# Patient Record
Sex: MALE | Race: WHITE | Employment: FULL TIME | URBAN - METROPOLITAN AREA
[De-identification: names, ages, dates, MRNs, and addresses within clinical notes are randomized per-mention and may not be internally consistent; named-entity substitution may affect disease eponyms.]

---

## 2017-05-26 ENCOUNTER — OFFICE VISIT (OUTPATIENT)
Dept: UROLOGY | Age: 57
End: 2017-05-26

## 2017-05-26 VITALS
WEIGHT: 209 LBS | SYSTOLIC BLOOD PRESSURE: 118 MMHG | DIASTOLIC BLOOD PRESSURE: 74 MMHG | OXYGEN SATURATION: 95 % | BODY MASS INDEX: 32.8 KG/M2 | HEIGHT: 67 IN | HEART RATE: 81 BPM

## 2017-05-26 DIAGNOSIS — Z80.42 FAMILY HISTORY OF PROSTATE CANCER: ICD-10-CM

## 2017-05-26 DIAGNOSIS — R97.20 ELEVATED PSA: Primary | ICD-10-CM

## 2017-05-26 LAB
BILIRUB UR QL STRIP: NEGATIVE
GLUCOSE UR-MCNC: NEGATIVE MG/DL
KETONES P FAST UR STRIP-MCNC: NEGATIVE MG/DL
PH UR STRIP: 7 [PH] (ref 4.6–8)
PROT UR QL STRIP: NEGATIVE MG/DL
SP GR UR STRIP: 1.02 (ref 1–1.03)
UA UROBILINOGEN AMB POC: NORMAL (ref 0.2–1)
URINALYSIS CLARITY POC: CLEAR
URINALYSIS COLOR POC: YELLOW
URINE BLOOD POC: NORMAL
URINE LEUKOCYTES POC: NEGATIVE
URINE NITRITES POC: NEGATIVE

## 2017-05-26 NOTE — PATIENT INSTRUCTIONS
Prostate Cancer Screening: Care Instructions  Your Care Instructions    The prostate gland is an organ found just below a man's bladder. It is the size and shape of a walnut. It surrounds the tube that carries urine from the bladder out of the body through the penis. This tube is called the urethra. Prostate cancer is the abnormal growth of cells in the prostate. It is the second most common type of cancer in men. (Skin cancer is the most common.)  Most cases of prostate cancer occur in men older than 72. The disease runs in families. And it's more common in -American men. When it's found and treated early, prostate cancer may be cured. But it is not always treated. This is because prostate cancer may not shorten your life, especially if you are older and the cancer is growing slowly. Follow-up care is a key part of your treatment and safety. Be sure to make and go to all appointments, and call your doctor if you are having problems. It's also a good idea to know your test results and keep a list of the medicines you take. What are the screening tests for prostate cancer? The main screening test for prostate cancer is the prostate-specific antigen (PSA) test. This is a blood test that measures how much PSA is in your blood. A high level may mean that you have an enlargement, an infection, or cancer. Along with the PSA test, you may have a digital rectal exam. The digital (finger) rectal exam checks for anything abnormal in your prostate. To do the exam, the doctor puts a lubricated, gloved finger into your rectum. If these tests suggest cancer, you may need a prostate biopsy. How is prostate cancer diagnosed? In a biopsy, the doctor takes small tissue samples from your prostate gland. Another doctor then looks at the tissue under a microscope to see if there are cancer cells, signs of infection, or other problems. The results help diagnose prostate cancer.   What are the pros and cons of screening? Neither a PSA test nor a digital rectal exam can tell you for sure that you do or do not have cancer. But they can help you decide if you need more tests, such as a prostate biopsy. Screening tests may be useful because most men with prostate cancer don't have symptoms. It can be hard to know if you have cancer until it is more advanced. And then it's harder to treat. But having a PSA test can also cause harm. The test may show high levels of PSA that aren't caused by cancer. So you could have a prostate biopsy you didn't need. Or the PSA test might be normal when there is cancer, so a cancer might not be found early. The test can also find cancers that would never have caused a problem during your lifetime. So you might have treatment that was not needed. Prostate cancer usually develops late in life and grows slowly. For many men, it does not shorten their lives. Some experts advise screening only for men who are at high risk. Talk with your doctor to see if screening is right for you. Where can you learn more? Go to http://reina-marge.info/. Enter R550 in the search box to learn more about \"Prostate Cancer Screening: Care Instructions. \"  Current as of: July 26, 2016  Content Version: 11.2  © 9367-7464 Gust, [employer]. Care instructions adapted under license by [employer] (which disclaims liability or warranty for this information). If you have questions about a medical condition or this instruction, always ask your healthcare professional. [place] [address] any warranty or liability for your use of this information.

## 2017-05-26 NOTE — PROGRESS NOTES
Chief Complaint   Patient presents with    New Patient    Elevated PSA     with family history or prostate cancer       HISTORY OF PRESENT ILLNESS:  Donna Patel is a 56 y.o. male who presents today with a progressive rise in his PSA over the past couple of years. His PSA previously had been around 2 but over this past year it increased over 3 and he is referred here because of a significant family history of prostate cancer in his father and a number of uncles. He has had no change in urination or any evidence of back pain weight loss peripheral edema or change in nocturia. ROS past history and review of systems are all documented on the chart, refer to that for details    Past Medical History:   Diagnosis Date    Hypercholesterolemia     Plantar fasciitis     Right knee pain        Past Surgical History:   Procedure Laterality Date    APPENDECTOMY      HX APPENDECTOMY  ~1976       Social History   Substance Use Topics    Smoking status: Never Smoker    Smokeless tobacco: Never Used      Comment: 1/1981    Alcohol use No      Comment: rarely       No Known Allergies    Family History   Problem Relation Age of Onset    Depression Mother     Asthma Mother     Diabetes Mother     Hypertension Mother     Depression Father     Cancer Father      prostate    Hypertension Father     High Cholesterol Father     Dementia Father     Prostate Cancer Paternal Uncle     Prostate Cancer Paternal Uncle     Prostate Cancer Paternal Uncle     Prostate Cancer Paternal Uncle        Current Outpatient Prescriptions   Medication Sig Dispense Refill    aspirin 81 mg chewable tablet Take 81 mg by mouth daily.  GLUCOSAMINE/D3/BOSWELLIA CLEVE (OSTEO BI-FLEX, 5-LOXIN, PO) Take  by mouth.  HYDROcodone-acetaminophen (NORCO) 5-325 mg per tablet Take 1 Tab by mouth every four (4) hours as needed for Pain. Max Daily Amount: 6 Tabs.  20 Tab 0    glucosamine-chondroitin 250-200 mg tab Take  by mouth two (2) times a day. PHYSICAL EXAMINATION:   Visit Vitals    /74 (BP 1 Location: Left arm, BP Patient Position: Sitting)    Pulse 81    Ht 5' 7\" (1.702 m)    Wt 209 lb (94.8 kg)    SpO2 95%    BMI 32.73 kg/m2     Constitutional: WDWN, Pleasant and appropriate affect, No acute distress. CV:  No peripheral swelling noted  Respiratory: No respiratory distress or difficulties  Abdomen:  No abdominal masses or tenderness. No CVA tenderness. No inguinal hernias noted.  Male:    WES:Perineum normal to visual inspection, no erythema or irritation, normal anal sphincter tone, the prostate is about 50 g in size firm but not really nodular and generally symmetrical.  Under normal conditions I would think this is probably benign in nature. SCROTUM:  No scrotal rash or lesions noticed. Normal bilateral testes and epididymis. PENIS: Urethral meatus normal in location and size. No urethral discharge. Skin: No jaundice. Neuro/Psych:  Alert and oriented x 3, affect appropriate. Lymphatic:   No enlarged inguinal lymph nodes. Results for orders placed or performed in visit on 05/26/17   AMB POC URINALYSIS DIP STICK AUTO W/O MICRO   Result Value Ref Range    Color (UA POC) Yellow     Clarity (UA POC) Clear     Glucose (UA POC) Negative Negative    Bilirubin (UA POC) Negative Negative    Ketones (UA POC) Negative Negative    Specific gravity (UA POC) 1.020 1.001 - 1.035    Blood (UA POC) Trace Negative    pH (UA POC) 7.0 4.6 - 8.0    Protein (UA POC) Negative Negative mg/dL    Urobilinogen (UA POC) 0.2 mg/dL 0.2 - 1    Nitrites (UA POC) Negative Negative    Leukocyte esterase (UA POC) Negative Negative         REVIEW OF LABS AND IMAGING:     Imaging Report Reviewed? NO     Images Reviewed? NO           Other Lab Data Reviewed? YES         ASSESSMENT:     ICD-10-CM ICD-9-CM    1.  Elevated PSA R97.20 790.93 [place] GÉNESIS,POST-VOID RES,US,NON-IMAGING      AMB POC URINALYSIS DIP STICK AUTO W/O MICRO   2. Family history of prostate cancer Z80.42 V16.42 [place] GÉNESIS,POST-VOID RES,US,NON-IMAGING      AMB POC URINALYSIS DIP STICK AUTO W/O MICRO            PLAN / DISCUSSION: : He and I had a lengthy discussion about the relationships of prostate cancer and its accuracy. In view of the fact that he has had previous PSAs and is trend is certainly up more than it should be for a man his age and with his strong family history of prostate cancer, I think we need to go ahead with a prostate biopsy. I have explained the method to him and the relative complications that may or may not be expected. Will make arrangements to do this as an outpatient. The patient expresses understanding and agreement of the discussion and plan. Zen Scott MD on 5/26/2017         Please note: This document has been produced using voice recognition software. Unrecognized errors in transcription may be present.

## 2017-05-26 NOTE — MR AVS SNAPSHOT
Visit Information Date & Time Provider Department Dept. Phone Encounter #  
 5/26/2017  9:30 AM Megan Christensen, [id number]  Your Appointments 6/15/2017  3:30 PM  
Office Visit with MD DARLENE [facility]) Appt Note: po prostate bx  
 [address] [place] [address] [address]  
[phone] [address] [place] [address] Upcoming Health Maintenance Date Due Hepatitis C Screening 1960 INFLUENZA AGE 9 TO ADULT 8/1/2017 DTaP/Tdap/Td series (2 - Td) 1/14/2022 COLONOSCOPY 4/25/2022 Allergies as of 5/26/2017  Review Complete On: 5/26/2017 By: Alexis Matos LPN No Known Allergies Current Immunizations  Reviewed on 1/14/2012 Name Date TDAP Vaccine 1/14/2012 Not reviewed this visit You Were Diagnosed With   
  
 Codes Comments Elevated PSA    -  Primary ICD-10-CM: R97.20 ICD-9-CM: 790.93 Family history of prostate cancer     ICD-10-CM: Z80.42 
ICD-9-CM: V16.42 Vitals BP Pulse Height(growth percentile) Weight(growth percentile) SpO2 BMI  
 118/74 (BP 1 Location: Left arm, BP Patient Position: Sitting) 81 5' 7\" (1.702 m) 209 lb (94.8 kg) 95% 32.73 kg/m2 Smoking Status Never Smoker Vitals History BMI and BSA Data Body Mass Index Body Surface Area 32.73 kg/m 2 2.12 m 2 Your Updated Medication List  
  
   
This list is accurate as of: 5/26/17  9:58 AM.  Always use your most recent med list.  
  
  
  
  
 aspirin 81 mg chewable tablet Take 81 mg by mouth daily. glucosamine-chondroitin 250-200 mg Tab Take  by mouth two (2) times a day. HYDROcodone-acetaminophen 5-325 mg per tablet Commonly known as:  Joyice Breeze Take 1 Tab by mouth every four (4) hours as needed for Pain. Max Daily Amount: 6 Tabs. OSTEO BI-FLEX (5-LOXIN) PO Take  by mouth. We Performed the Following AMB POC URINALYSIS DIP STICK AUTO W/O MICRO [52077 CPT(R)] [place] GÉNESIS,POST-VOID RES,US,NON-IMAGING [id number] CPT(R)] Patient Instructions Prostate Cancer Screening: Care Instructions Your Care Instructions The prostate gland is an organ found just below a man's bladder. It is the size and shape of a walnut. It surrounds the tube that carries urine from the bladder out of the body through the penis. This tube is called the urethra. Prostate cancer is the abnormal growth of cells in the prostate. It is the second most common type of cancer in men. (Skin cancer is the most common.) Most cases of prostate cancer occur in men older than 72. The disease runs in families. And it's more common in -American men. When it's found and treated early, prostate cancer may be cured. But it is not always treated. This is because prostate cancer may not shorten your life, especially if you are older and the cancer is growing slowly. Follow-up care is a key part of your treatment and safety. Be sure to make and go to all appointments, and call your doctor if you are having problems. It's also a good idea to know your test results and keep a list of the medicines you take. What are the screening tests for prostate cancer? The main screening test for prostate cancer is the prostate-specific antigen (PSA) test. This is a blood test that measures how much PSA is in your blood. A high level may mean that you have an enlargement, an infection, or cancer. Along with the PSA test, you may have a digital rectal exam. The digital (finger) rectal exam checks for anything abnormal in your prostate. To do the exam, the doctor puts a lubricated, gloved finger into your rectum. If these tests suggest cancer, you may need a prostate biopsy. How is prostate cancer diagnosed? In a biopsy, the doctor takes small tissue samples from your prostate gland. Another doctor then looks at the tissue under a microscope to see if there are cancer cells, signs of infection, or other problems. The results help diagnose prostate cancer. What are the pros and cons of screening? Neither a PSA test nor a digital rectal exam can tell you for sure that you do or do not have cancer. But they can help you decide if you need more tests, such as a prostate biopsy. Screening tests may be useful because most men with prostate cancer don't have symptoms. It can be hard to know if you have cancer until it is more advanced. And then it's harder to treat. But having a PSA test can also cause harm. The test may show high levels of PSA that aren't caused by cancer. So you could have a prostate biopsy you didn't need. Or the PSA test might be normal when there is cancer, so a cancer might not be found early. The test can also find cancers that would never have caused a problem during your lifetime. So you might have treatment that was not needed. Prostate cancer usually develops late in life and grows slowly. For many men, it does not shorten their lives. Some experts advise screening only for men who are at high risk. Talk with your doctor to see if screening is right for you. Where can you learn more? Go to http://reina-marge.info/. Enter R550 in the search box to learn more about \"Prostate Cancer Screening: Care Instructions. \" Current as of: July 26, 2016 Content Version: 11.2 © 9527-9901 [employer]. Care instructions adapted under license by [employer] (which disclaims liability or warranty for this information). If you have questions about a medical condition or this instruction, always ask your healthcare professional. [place] [address] any warranty or liability for your use of this information. Introducing [facility]!    
 Dear Flash Eldridge: 
 Thank you for requesting a [employer] account. Our records indicate that you already have an active [employer] account. You can access your account anytime at [email or web address] Did you know that you can access your hospital and ER discharge instructions at any time in [employer]? You can also review all of your test results from your hospital stay or ER visit. Additional Information If you have questions, please visit the Frequently Asked Questions section of the [employer] website at [email or web address] Remember, [employer] is NOT to be used for urgent needs. For medical emergencies, dial 911. Now available from your iPhone and Android! Please provide this summary of care documentation to your next provider. Your primary care clinician is listed as Jay Jay Styles. If you have any questions after today's visit, please call [phone].

## 2017-05-26 NOTE — PROGRESS NOTES
Mr. Du Pitt has a reminder for a \"due or due soon\" health maintenance. I have asked that he contact his primary care provider for follow-up on this health maintenance.

## 2017-06-06 ENCOUNTER — ANESTHESIA EVENT (OUTPATIENT)
Dept: SURGERY | Age: 57
End: 2017-06-06
Payer: OTHER GOVERNMENT

## 2017-06-06 NOTE — H&P
Office Visit     5/26/2017  [facility] Oleg Sanon MD   Urology    Elevated PSA +1 more   Dx    New Patient, Elevated PSA    Reason for visit    Progress Notes                  Chief Complaint   Patient presents with    New Patient    Elevated PSA       with family history or prostate cancer         HISTORY OF PRESENT ILLNESS: Carlos Adame is a 56 y.o. male who presents today with a progressive rise in his PSA over the past couple of years. His PSA previously had been around 2 but over this past year it increased over 3 and he is referred here because of a significant family history of prostate cancer in his father and a number of uncles.  He has had no change in urination or any evidence of back pain weight loss peripheral edema or change in nocturia.               ROS past history and review of systems are all documented on the chart, refer to that for details          Past Medical History:   Diagnosis Date    Hypercholesterolemia      Plantar fasciitis      Right knee pain                 Past Surgical History:   Procedure Laterality Date    APPENDECTOMY        HX APPENDECTOMY   ~1976                Social History   Substance Use Topics    Smoking status: Never Smoker    Smokeless tobacco: Never Used         Comment: 1/1981    Alcohol use No         Comment: rarely         No Known Allergies            Family History   Problem Relation Age of Onset    Depression Mother      Asthma Mother      Diabetes Mother      Hypertension Mother      Depression Father      Cancer Father         prostate    Hypertension Father      High Cholesterol Father      Dementia Father      Prostate Cancer Paternal Uncle      Prostate Cancer Paternal Uncle      Prostate Cancer Paternal Uncle      Prostate Cancer Paternal Uncle                  Current Outpatient Prescriptions   Medication Sig Dispense Refill    aspirin 81 mg chewable tablet Take 81 mg by mouth daily.        GLUCOSAMINE/D3/BOSWELLIA CLEVE (OSTEO BI-FLEX, 5-LOXIN, PO) Take by mouth.        HYDROcodone-acetaminophen (NORCO) 5-325 mg per tablet Take 1 Tab by mouth every four (4) hours as needed for Pain. Max Daily Amount: 6 Tabs. 20 Tab 0    glucosamine-chondroitin 250-200 mg tab Take by mouth two (2) times a day.                PHYSICAL EXAMINATION:        Visit Vitals    /74 (BP 1 Location: Left arm, BP Patient Position: Sitting)    Pulse 81    Ht 5' 7\" (1.702 m)    Wt 209 lb (94.8 kg)    SpO2 95%    BMI 32.73 kg/m2      Constitutional: WDWN, Pleasant and appropriate affect, No acute distress. CV: No peripheral swelling noted  Respiratory: No respiratory distress or difficulties  Abdomen: No abdominal masses or tenderness. No CVA tenderness. No inguinal hernias noted.  Male:   WES:Perineum normal to visual inspection, no erythema or irritation, normal anal sphincter tone, the prostate is about 50 g in size firm but not really nodular and generally symmetrical. Under normal conditions I would think this is probably benign in nature. SCROTUM: No scrotal rash or lesions noticed. Normal bilateral testes and epididymis. PENIS: Urethral meatus normal in location and size. No urethral discharge. Skin: No jaundice. Neuro/Psych: Alert and oriented x 3, affect appropriate.    Lymphatic: No enlarged inguinal lymph nodes.                Results for orders placed or performed in visit on 05/26/17   AMB POC URINALYSIS DIP STICK AUTO W/O MICRO   Result Value Ref Range     Color (UA POC) Yellow       Clarity (UA POC) Clear       Glucose (UA POC) Negative Negative     Bilirubin (UA POC) Negative Negative     Ketones (UA POC) Negative Negative     Specific gravity (UA POC) 1.020 1.001 - 1.035     Blood (UA POC) Trace Negative     pH (UA POC) 7.0 4.6 - 8.0     Protein (UA POC) Negative Negative mg/dL     Urobilinogen (UA POC) 0.2 mg/dL 0.2 - 1     Nitrites (UA POC) Negative Negative     Leukocyte esterase (UA POC) Negative Negative            REVIEW OF LABS AND IMAGING:      Imaging Report Reviewed? NO   Images Reviewed? NO      Other Lab Data Reviewed? YES            ASSESSMENT:       ICD-10-CM ICD-9-CM     1. Elevated PSA R97.20 790.93 [place] GÉNESIS,POST-VOID RES,US,NON-IMAGING         AMB POC URINALYSIS DIP STICK AUTO W/O MICRO   2. Family history of prostate cancer Z80.42 V16.42 [place] GÉNESIS,POST-VOID RES,US,NON-IMAGING         AMB POC URINALYSIS DIP STICK AUTO W/O MICRO              PLAN / DISCUSSION: : He and I had a lengthy discussion about the relationships of prostate cancer and its accuracy. In view of the fact that he has had previous PSAs and is trend is certainly up more than it should be for a man his age and with his strong family history of prostate cancer, I think we need to go ahead with a prostate biopsy. I have explained the method to him and the relative complications that may or may not be expected. Will make arrangements to do this as an outpatient.     The patient expresses understanding and agreement of the discussion and plan.     Deb Blank MD on 5/26/2017            Please note:     This document has been produced using voice recognition software.  Unrecognized errors in transcription may be present.

## 2017-06-07 ENCOUNTER — HOSPITAL ENCOUNTER (OUTPATIENT)
Age: 57
Setting detail: OUTPATIENT SURGERY
Discharge: HOME | End: 2017-06-07
Attending: UROLOGY | Admitting: UROLOGY
Payer: OTHER GOVERNMENT

## 2017-06-07 ENCOUNTER — ANESTHESIA (OUTPATIENT)
Dept: SURGERY | Age: 57
End: 2017-06-07
Payer: OTHER GOVERNMENT

## 2017-06-07 VITALS
RESPIRATION RATE: 16 BRPM | WEIGHT: 208.4 LBS | HEIGHT: 67 IN | OXYGEN SATURATION: 94 % | DIASTOLIC BLOOD PRESSURE: 72 MMHG | HEART RATE: 69 BPM | BODY MASS INDEX: 32.71 KG/M2 | SYSTOLIC BLOOD PRESSURE: 111 MMHG | TEMPERATURE: 96.9 F

## 2017-06-07 PROCEDURE — 74011250636 HC RX REV CODE- 250/636: Performed by: UROLOGY

## 2017-06-07 PROCEDURE — 77030018836 HC SOL IRR NACL ICUM -A: Performed by: UROLOGY

## 2017-06-07 PROCEDURE — 76060000032 HC ANESTHESIA 0.5 TO 1 HR: Performed by: UROLOGY

## 2017-06-07 PROCEDURE — 74011250636 HC RX REV CODE- 250/636

## 2017-06-07 PROCEDURE — 77030003481 HC NDL BIOP GUN BARD -B: Performed by: UROLOGY

## 2017-06-07 PROCEDURE — 77030003439 HC NDL BIOP BARD -B: Performed by: UROLOGY

## 2017-06-07 PROCEDURE — G0416 PROSTATE BIOPSY, ANY MTHD: HCPCS | Performed by: UROLOGY

## 2017-06-07 PROCEDURE — 77030032490 HC SLV COMPR SCD KNE COVD -B: Performed by: UROLOGY

## 2017-06-07 PROCEDURE — 76010000160 HC OR TIME 0.5 TO 1 HR INTENSV-TIER 1: Performed by: UROLOGY

## 2017-06-07 PROCEDURE — 74011000250 HC RX REV CODE- 250

## 2017-06-07 PROCEDURE — 77030020782 HC GWN BAIR PAWS FLX 3M -B: Performed by: UROLOGY

## 2017-06-07 PROCEDURE — 74011250636 HC RX REV CODE- 250/636: Performed by: NURSE ANESTHETIST, CERTIFIED REGISTERED

## 2017-06-07 PROCEDURE — 74011250637 HC RX REV CODE- 250/637: Performed by: NURSE ANESTHETIST, CERTIFIED REGISTERED

## 2017-06-07 PROCEDURE — 76210000063 HC OR PH I REC FIRST 0.5 HR: Performed by: UROLOGY

## 2017-06-07 PROCEDURE — 76210000020 HC REC RM PH II FIRST 0.5 HR: Performed by: UROLOGY

## 2017-06-07 RX ORDER — DEXAMETHASONE SODIUM PHOSPHATE 4 MG/ML
INJECTION, SOLUTION INTRA-ARTICULAR; INTRALESIONAL; INTRAMUSCULAR; INTRAVENOUS; SOFT TISSUE AS NEEDED
Status: DISCONTINUED | OUTPATIENT
Start: 2017-06-07 | End: 2017-06-07 | Stop reason: HOSPADM

## 2017-06-07 RX ORDER — NALOXONE HYDROCHLORIDE 0.4 MG/ML
0.1 INJECTION, SOLUTION INTRAMUSCULAR; INTRAVENOUS; SUBCUTANEOUS AS NEEDED
Status: DISCONTINUED | OUTPATIENT
Start: 2017-06-07 | End: 2017-06-07 | Stop reason: HOSPADM

## 2017-06-07 RX ORDER — SODIUM CHLORIDE 0.9 % (FLUSH) 0.9 %
5-10 SYRINGE (ML) INJECTION EVERY 8 HOURS
Status: DISCONTINUED | OUTPATIENT
Start: 2017-06-07 | End: 2017-06-07 | Stop reason: HOSPADM

## 2017-06-07 RX ORDER — ONDANSETRON 2 MG/ML
4 INJECTION INTRAMUSCULAR; INTRAVENOUS ONCE
Status: DISCONTINUED | OUTPATIENT
Start: 2017-06-07 | End: 2017-06-07 | Stop reason: HOSPADM

## 2017-06-07 RX ORDER — HYDROMORPHONE HYDROCHLORIDE 2 MG/ML
0.5 INJECTION, SOLUTION INTRAMUSCULAR; INTRAVENOUS; SUBCUTANEOUS
Status: DISCONTINUED | OUTPATIENT
Start: 2017-06-07 | End: 2017-06-07 | Stop reason: HOSPADM

## 2017-06-07 RX ORDER — FENTANYL CITRATE 50 UG/ML
INJECTION, SOLUTION INTRAMUSCULAR; INTRAVENOUS AS NEEDED
Status: DISCONTINUED | OUTPATIENT
Start: 2017-06-07 | End: 2017-06-07 | Stop reason: HOSPADM

## 2017-06-07 RX ORDER — SODIUM CHLORIDE 0.9 % (FLUSH) 0.9 %
5-10 SYRINGE (ML) INJECTION AS NEEDED
Status: DISCONTINUED | OUTPATIENT
Start: 2017-06-07 | End: 2017-06-07 | Stop reason: HOSPADM

## 2017-06-07 RX ORDER — GENTAMICIN SULFATE 80 MG/100ML
80 INJECTION, SOLUTION INTRAVENOUS ONCE
Status: DISCONTINUED | OUTPATIENT
Start: 2017-06-07 | End: 2017-06-07 | Stop reason: SDUPTHER

## 2017-06-07 RX ORDER — CIPROFLOXACIN 500 MG/1
500 TABLET ORAL 2 TIMES DAILY
Qty: 10 TAB | Refills: 0 | Status: SHIPPED | OUTPATIENT
Start: 2017-06-07 | End: 2017-06-12

## 2017-06-07 RX ORDER — PROPOFOL 10 MG/ML
INJECTION, EMULSION INTRAVENOUS AS NEEDED
Status: DISCONTINUED | OUTPATIENT
Start: 2017-06-07 | End: 2017-06-07 | Stop reason: HOSPADM

## 2017-06-07 RX ORDER — SODIUM CHLORIDE, SODIUM LACTATE, POTASSIUM CHLORIDE, CALCIUM CHLORIDE 600; 310; 30; 20 MG/100ML; MG/100ML; MG/100ML; MG/100ML
75 INJECTION, SOLUTION INTRAVENOUS CONTINUOUS
Status: DISCONTINUED | OUTPATIENT
Start: 2017-06-07 | End: 2017-06-07 | Stop reason: HOSPADM

## 2017-06-07 RX ORDER — MIDAZOLAM HYDROCHLORIDE 1 MG/ML
INJECTION, SOLUTION INTRAMUSCULAR; INTRAVENOUS AS NEEDED
Status: DISCONTINUED | OUTPATIENT
Start: 2017-06-07 | End: 2017-06-07 | Stop reason: HOSPADM

## 2017-06-07 RX ORDER — MORPHINE SULFATE 2 MG/ML
2 INJECTION, SOLUTION INTRAMUSCULAR; INTRAVENOUS
Status: DISCONTINUED | OUTPATIENT
Start: 2017-06-07 | End: 2017-06-07 | Stop reason: HOSPADM

## 2017-06-07 RX ORDER — LIDOCAINE HYDROCHLORIDE 20 MG/ML
INJECTION, SOLUTION EPIDURAL; INFILTRATION; INTRACAUDAL; PERINEURAL AS NEEDED
Status: DISCONTINUED | OUTPATIENT
Start: 2017-06-07 | End: 2017-06-07 | Stop reason: HOSPADM

## 2017-06-07 RX ORDER — FAMOTIDINE 20 MG/1
20 TABLET, FILM COATED ORAL ONCE
Status: COMPLETED | OUTPATIENT
Start: 2017-06-07 | End: 2017-06-07

## 2017-06-07 RX ORDER — ONDANSETRON 2 MG/ML
INJECTION INTRAMUSCULAR; INTRAVENOUS AS NEEDED
Status: DISCONTINUED | OUTPATIENT
Start: 2017-06-07 | End: 2017-06-07 | Stop reason: HOSPADM

## 2017-06-07 RX ORDER — GENTAMICIN SULFATE 80 MG/100ML
80 INJECTION, SOLUTION INTRAVENOUS ONCE
Status: COMPLETED | OUTPATIENT
Start: 2017-06-07 | End: 2017-06-07

## 2017-06-07 RX ADMIN — PROPOFOL 300 MG: 10 INJECTION, EMULSION INTRAVENOUS at 08:55

## 2017-06-07 RX ADMIN — SODIUM CHLORIDE, SODIUM LACTATE, POTASSIUM CHLORIDE, AND CALCIUM CHLORIDE 75 ML/HR: 600; 310; 30; 20 INJECTION, SOLUTION INTRAVENOUS at 08:18

## 2017-06-07 RX ADMIN — FENTANYL CITRATE 25 MCG: 50 INJECTION, SOLUTION INTRAMUSCULAR; INTRAVENOUS at 09:00

## 2017-06-07 RX ADMIN — GENTAMICIN SULFATE 80 MG: 80 INJECTION, SOLUTION INTRAVENOUS at 08:46

## 2017-06-07 RX ADMIN — FENTANYL CITRATE 25 MCG: 50 INJECTION, SOLUTION INTRAMUSCULAR; INTRAVENOUS at 08:51

## 2017-06-07 RX ADMIN — LIDOCAINE HYDROCHLORIDE 100 MG: 20 INJECTION, SOLUTION EPIDURAL; INFILTRATION; INTRACAUDAL; PERINEURAL at 08:55

## 2017-06-07 RX ADMIN — ONDANSETRON 4 MG: 2 INJECTION INTRAMUSCULAR; INTRAVENOUS at 08:58

## 2017-06-07 RX ADMIN — MIDAZOLAM HYDROCHLORIDE 2 MG: 1 INJECTION, SOLUTION INTRAMUSCULAR; INTRAVENOUS at 08:46

## 2017-06-07 RX ADMIN — FAMOTIDINE 20 MG: 20 TABLET ORAL at 08:19

## 2017-06-07 RX ADMIN — DEXAMETHASONE SODIUM PHOSPHATE 4 MG: 4 INJECTION, SOLUTION INTRA-ARTICULAR; INTRALESIONAL; INTRAMUSCULAR; INTRAVENOUS; SOFT TISSUE at 08:58

## 2017-06-07 NOTE — OP NOTES
[address]    Name:  Chandana Rosales  MR#:  [id number]  :  1960  Account #:  [de-identified]  Date of Adm:  2017  Date of Surgery:      PREOPERATIVE DIAGNOSIS: Elevated prostate-specific antigen. POSTOPERATIVE DIAGNOSIS: Elevated prostate-specific antigen. PROCEDURES PERFORMED: Transrectal ultrasound-guided biopsy  of the prostate. ESTIMATED BLOOD LOSS: 5 mL. SPECIMENS REMOVED: Prostate biopsies (12). ANESTHESIA: General.    DESCRIPTION OF PROCEDURE: Under satisfactory anesthesia, the  patient was positioned for a transrectal ultrasound biopsy. The  ultrasound probe was inserted, the gland was surveyed in 2 planes. He  had a number of calcifications in the gland. The biopsies were then  taken 2 in the apex, mid and base of the prostate. These were done on  the right and the left sides respectively and sent as separate  containers. A total of 12 biopsies were performed. He tolerated this  well and was taken to the recovery room in stable condition. He  received gentamicin preoperatively and will be sent home on Cipro for  5 days, and followed up in the office next week with Dr. Amara Velasquez.         MD MELVIN Lopez / Sandra Westfall  D:  2017   09:31  T:  2017   09:44  Job #:  [id number]

## 2017-06-07 NOTE — IP AVS SNAPSHOT
[address] [place] [id number] 
[phone] Patient: Jacey Dupont MRN: [id number] You are allergic to the following No active allergies Recent Documentation Height Weight BMI Smoking Status 1.702 m 94.5 kg 32.64 kg/m2 Never Smoker Emergency Contacts Name Discharge Info Relation Home Work Mobile Melonie Suárez CAREGIVER [3] Spouse [3] [phone] About your hospitalization You were admitted on:  June 7, 2017 You last received care in the:  [facility] PAC[facility] You were discharged on:  June 7, 2017 Unit phone number:  [phone] Why you were hospitalized Your primary diagnosis was:  Not on File Providers Seen During Your Hospitalizations Provider Role Specialty Primary office phone Ascencion Martinez MD Attending Provider Urology [phone] Your Primary Care Physician (PCP) Primary Care Physician Office Phone Office Fax Hayden Ramesh [phone] Follow-up Information Follow up With Details Comments Contact Info Jose Antonio Roblero MD   [place] [address] [address] [place] [address] 
[phone] Ascencion Martinez MD  Arrange a one week follow up or go as scheduled. [address] [place] [address] [address] [address] 
[phone] Your Appointments Thursday Hali 15, 2017  3:30 PM EDT Office Visit with Ashlee Davis MD  
[facility] [address] [facility] [address] [address]  
[phone] Current Discharge Medication List  
  
START taking these medications Dose & Instructions Dispensing Information Comments Morning Noon Evening Bedtime  
 ciprofloxacin HCl 500 mg tablet Commonly known as:  CIPRO Your last dose was:     
   
Your next dose is:    
   
   
 Dose:  500 mg  
 Take 1 Tab by mouth two (2) times a day for 10 doses. Indications: PREVENTION OF BACTERIAL URINARY TRACT INFECTION Quantity:  10 Tab Refills:  0 CONTINUE these medications which have NOT CHANGED Dose & Instructions Dispensing Information Comments Morning Noon Evening Bedtime  
 aspirin 81 mg chewable tablet Your last dose was: Your next dose is:    
   
   
 Dose:  81 mg Take 81 mg by mouth daily. Refills:  0 Where to Get Your Medications Information on where to get these meds will be given to you by the nurse or doctor. ! Ask your nurse or doctor about these medications  
  ciprofloxacin HCl 500 mg tablet Discharge Instructions Prostate Biopsy and Ultrasound: About This Test 
What is it? A prostate biopsy is a type of test. Your doctor takes small tissue samples from your prostate gland. Then another doctor looks at the tissue under a microscope to see if there are cancer cells. This test is done by a doctor who specializes in men's genital and urinary problems (urologist). It can be done in your doctor's office, a day surgery clinic, or a hospital operating room. To get the tissue samples from the prostate, the doctor inserts a thin needle through the rectum, the urethra, or the area between the anus and scrotum (perineum). The most common method is through the rectum. Your doctor may use ultrasound to help guide the needle. · A prostate biopsy has a slight risk of causing problems such as infection or bleeding. · If the biopsy went through your rectum, you may have a small amount of bleeding from your rectum for 2 to 3 days after the biopsy. · You may have a little pain in your pelvic area. You may also have a little blood in your urine for 1 to 5 days. · You may have some blood in your semen for a week or longer. · If you have anesthesia that makes you sleep, you will be in a recovery room for a few hours. You will need someone to drive you home. You may feel tired for the rest of the day. · You will probably be able to go home right away. · If your doctor had you stop taking any medicine for the biopsy, ask him or her when you can start taking it again. If you were given antibiotics, take them as directed. · Do not do heavy work or exercise for 4 hours after the test. 
· Your doctor will tell you how long it may take to get your results back. Follow-up care is a key part of your treatment and safety. Be sure to make and go to all appointments, and call your doctor if you are having problems. It's also a good idea to keep a list of the medicines you take. Ask your doctor when you can expect to have your test results. Where can you learn more? Go to http://reina-marge.info/. Enter Y504 in the search box to learn more about \"Prostate Biopsy and Ultrasound: About This Test.\" Current as of: August 19, 2016 Content Version: 11.2 © 2900-5071 [employer]. Care instructions adapted under license by [employer] (which disclaims liability or warranty for this information). If you have questions about a medical condition or this instruction, always ask your healthcare professional. [address] any warranty or liability for your use of this information. Ciprofloxacin (By mouth) Ciprofloxacin (uro-aql-VSUM-a-sin) Treats infections and plague. This medicine is a quinolone antibiotic. Brand Name(s): Cipro There may be other brand names for this medicine. When This Medicine Should Not Be Used: This medicine is not right for everyone. Do not use it if you had an allergic reaction to ciprofloxacin or to similar medicines. How to Use This Medicine:  
Liquid, Tablet, Long Acting Tablet · Your doctor will tell you how much medicine to use. Do not use more than directed. Take this medicine at the same time each day. · You may take this medicine with or without food. Do not take this medicine with only a source of calcium, such as milk, yogurt, or juice that contains added calcium. You may have foods or drinks that contain calcium as part of a larger meal. 
· Swallow the extended-release tablet whole. Do not crush, break, or chew it. · Oral liquid: Shake for at least 15 seconds just before each use. The liquid has small beads floating in it. Do not chew the beads when you drink the liquid. Measure the oral liquid medicine with a marked measuring spoon, oral syringe, or medicine cup. · Tablet: Swallow whole. Do not break, crush, or chew it. · Drink extra fluids so you will urinate more often and help prevent kidney problems. · Take all of the medicine in your prescription to clear up your infection, even if you feel better after the first few doses. · This medicine should come with a Medication Guide. Ask your pharmacist for a copy if you do not have one. · Missed dose: Take a dose as soon as you remember. If it is almost time for your next dose, wait until then and take a regular dose. Do not take extra medicine to make up for a missed dose. · Store the medicine in a closed container at room temperature, away from heat, moisture, and direct light. Throw away any leftover liquid medicine after 14 days. Drugs and Foods to Avoid: Ask your doctor or pharmacist before using any other medicine, including over-the-counter medicines, vitamins, and herbal products. · Do not use this medicine together with tizanidine. · Some foods and medicines can affect how ciprofloxacin works. Tell your doctor if you are using any of the following: ¨ Clozapine, cyclosporine, duloxetine, lidocaine, methotrexate, olanzapine, pentoxifylline, phenytoin, probenecid, ropinirole, sildenafil, theophylline ¨ Antibiotic (including azithromycin, clarithromycin, erythromycin) ¨ Blood thinner (including warfarin) ¨ Diabetes medicine (including glimepiride, glyburide) ¨ Medicine for depression or mental illness ¨ Medicine for heart rhythm problems (including amiodarone, procainamide, quinidine, sotalol) ¨ NSAID pain medicine (including aspirin, celecoxib, diclofenac, ibuprofen, naproxen) ¨ Steroid medicine (including hydrocortisone, methylprednisolone, prednisone) · Take ciprofloxacin at least 2 hours before or 6 hours after you take antacids containing aluminum or magnesium, calcium, zinc, iron, lanthanum, sevelamer, sucralfate, and didanosine. This includes vitamin/mineral supplements. · This medicine slows the digestion of caffeine, so it might affect you for longer than normal. 
Warnings While Using This Medicine: · Tell your doctor if you are pregnant or breastfeeding, or if you have kidney disease, liver disease, diabetes, heart disease, myasthenia gravis, or a history of heart rhythm problems (such as prolonged QT interval) or seizures. Tell your doctor if you have ever had tendon or joint problems, including rheumatoid arthritis, or if you have received a transplant. · This medicine may cause the following problems: 
¨ Tendinitis and tendon rupture (may happen after treatment ends) ¨ Liver damage ¨ Nerve damage in the arms or legs ¨ Heart rhythm changes ¨ Changes in blood sugar levels · This medicine may make you dizzy, drowsy, or lightheaded. Do not drive or do anything that could be dangerous until you know how this medicine affects you. · This medicine can cause diarrhea. Call your doctor if the diarrhea becomes severe, does not stop, or is bloody. Do not take any medicine to stop diarrhea until you have talked to your doctor. Diarrhea can occur 2 months or more after you stop taking this medicine. · This medicine may make your skin more sensitive to sunlight.  Wear sunscreen. Do not use sunlamps or tanning beds. · Call your doctor if your symptoms do not improve or if they get worse. · Keep all medicine out of the reach of children. Never share your medicine with anyone. Possible Side Effects While Using This Medicine:  
Call your doctor right away if you notice any of these side effects: · Allergic reaction: Itching or hives, swelling in your face or hands, swelling or tingling in your mouth or throat, chest tightness, trouble breathing · Blistering, peeling, red skin rash · Dark-colored urine or pale stools, nausea, vomiting, loss of appetite, pain in your upper stomach, yellow skin or eyes · Diarrhea that may contain blood · Fainting, dizziness, or lightheadedness · Fast, slow, or uneven heartbeat · Numbness, tingling, weakness, or burning pain in your hands, arms, legs, or feet · Pain, stiffness, swelling, or bruises around your ankle, leg, shoulder, or other joint · Seizures, severe headache, unusual thoughts or behaviors, trouble sleeping, feeling anxious, confused, or depressed, seeing, hearing, or feeling things that are not there · Unusual bleeding, bruising, or weakness If you notice other side effects that you think are caused by this medicine, tell your doctor. Call your doctor for medical advice about side effects. You may report side effects to FDA at 4-136-FDA-3423 © 2017 [facility] Information is for End User's use only and may not be sold, redistributed or otherwise used for commercial purposes. The above information is an  only. It is not intended as medical advice for individual conditions or treatments. Talk to your doctor, nurse or pharmacist before following any medical regimen to see if it is safe and effective for you. DISCHARGE SUMMARY from Nurse The following personal items are in your possession at time of discharge: 
 
Dental Appliances: None Visual Aid: None Home Medications: None Jewelry: None Clothing: Pants, Shirt, Undergarments, Footwear PATIENT INSTRUCTIONS: 
 
 
F-face looks uneven A-arms unable to move or move unevenly S-speech slurred or non-existent T-time-call 911 as soon as signs and symptoms begin-DO NOT go Back to bed or wait to see if you get better-TIME IS BRAIN. Warning Signs of HEART ATTACK Call 911 if you have these symptoms: 
? Chest discomfort. Most heart attacks involve discomfort in the center of the chest that lasts more than a few minutes, or that goes away and comes back. It can feel like uncomfortable pressure, squeezing, fullness, or pain. ? Discomfort in other areas of the upper body. Symptoms can include pain or discomfort in one or both arms, the back, neck, jaw, or stomach. ? Shortness of breath with or without chest discomfort. ? Other signs may include breaking out in a cold sweat, nausea, or lightheadedness. Don't wait more than five minutes to call [address]! Fast action can save your life. Calling 911 is almost always the fastest way to get lifesaving treatment. Emergency Medical Services staff can begin treatment when they arrive  up to an hour sooner than if someone gets to the hospital by car. The discharge information has been reviewed with the patient and spouse. The patient and spouse verbalized understanding. Discharge medications reviewed with the patient and spouse and appropriate educational materials and side effects teaching were provided. Discharge Orders None Introducing [facility] SERVICES! Dear Micaela Partida: 
Thank you for requesting a [employer] account. Our records indicate that you already have an active [employer] account. You can access your account anytime at [email or web address] Did you know that you can access your hospital and ER discharge instructions at any time in MyChart? You can also review all of your test results from your hospital stay or ER visit. Additional Information If you have questions, please visit the Frequently Asked Questions section of the [employer] website at [email or web address] Remember, MyChart is NOT to be used for urgent needs. For medical emergencies, dial 911. Now available from your iPhone and Android! General Information Please provide this summary of care documentation to your next provider. Patient Signature:  ____________________________________________________________ Date:  ____________________________________________________________  
  
Melina Torri Provider Signature:  ____________________________________________________________ Date:  ____________________________________________________________

## 2017-06-07 NOTE — ANESTHESIA POSTPROCEDURE EVALUATION
Post-Anesthesia Evaluation and Assessment    Patient: Sabrina Briones MRN: [id number]  SSN: [id number]    YOB: 1960  Age: 56 y.o. Sex: male       Cardiovascular Function/Vital Signs  Visit Vitals    /74    Pulse 78    Temp 36.4 °C (97.5 °F)    Resp 19    Ht 5' 7\" (1.702 m)    Wt 94.5 kg (208 lb 6.4 oz)    SpO2 95%    BMI 32.64 kg/m2       Patient is status post general anesthesia for Procedure(s):  PROSTATE BIOPSY NEEDLE/ULTRASOUND/FORTEC. Nausea/Vomiting: None    Postoperative hydration reviewed and adequate. Pain:  Pain Scale 1: Numeric (0 - 10) (06/07/17 0950)  Pain Intensity 1: 0 (06/07/17 0950)   Managed    Neurological Status:   Neuro (WDL): Exceptions to WDL (06/07/17 2967)  Neuro  Neurologic State: Drowsy (06/07/17 3266)   At baseline    Mental Status and Level of Consciousness: Arousable    Pulmonary Status:   O2 Device: Room air (06/07/17 0943)   Adequate oxygenation and airway patent    Complications related to anesthesia: None    Post-anesthesia assessment completed.  No concerns    Signed By: Obed Silva MD     June 7, 2017

## 2017-06-07 NOTE — DISCHARGE INSTRUCTIONS
Prostate Biopsy and Ultrasound: About This Test  What is it? A prostate biopsy is a type of test. Your doctor takes small tissue samples from your prostate gland. Then another doctor looks at the tissue under a microscope to see if there are cancer cells. This test is done by a doctor who specializes in men's genital and urinary problems (urologist). It can be done in your doctor's office, a day surgery clinic, or a hospital operating room. To get the tissue samples from the prostate, the doctor inserts a thin needle through the rectum, the urethra, or the area between the anus and scrotum (perineum). The most common method is through the rectum. Your doctor may use ultrasound to help guide the needle. · A prostate biopsy has a slight risk of causing problems such as infection or bleeding. · If the biopsy went through your rectum, you may have a small amount of bleeding from your rectum for 2 to 3 days after the biopsy. · You may have a little pain in your pelvic area. You may also have a little blood in your urine for 1 to 5 days. · You may have some blood in your semen for a week or longer. · If you have anesthesia that makes you sleep, you will be in a recovery room for a few hours. You will need someone to drive you home. You may feel tired for the rest of the day. · You will probably be able to go home right away. · If your doctor had you stop taking any medicine for the biopsy, ask him or her when you can start taking it again. If you were given antibiotics, take them as directed. · Do not do heavy work or exercise for 4 hours after the test.  · Your doctor will tell you how long it may take to get your results back. Follow-up care is a key part of your treatment and safety. Be sure to make and go to all appointments, and call your doctor if you are having problems. It's also a good idea to keep a list of the medicines you take.  Ask your doctor when you can expect to have your test results. Where can you learn more? Go to http://reina-marge.info/. Enter Y504 in the search box to learn more about \"Prostate Biopsy and Ultrasound: About This Test.\"  Current as of: August 19, 2016  Content Version: 11.2  © 1079-7587 [employer]. Care instructions adapted under license by [employer] (which disclaims liability or warranty for this information). If you have questions about a medical condition or this instruction, always ask your healthcare professional. [address] any warranty or liability for your use of this information. Ciprofloxacin (By mouth)   Ciprofloxacin ([id number]-IUCP-a-sin)  Treats infections and plague. This medicine is a quinolone antibiotic. Brand Name(s): Cipro   There may be other brand names for this medicine. When This Medicine Should Not Be Used: This medicine is not right for everyone. Do not use it if you had an allergic reaction to ciprofloxacin or to similar medicines. How to Use This Medicine:   Liquid, Tablet, Long Acting Tablet  · Your doctor will tell you how much medicine to use. Do not use more than directed. Take this medicine at the same time each day. · You may take this medicine with or without food. Do not take this medicine with only a source of calcium, such as milk, yogurt, or juice that contains added calcium. You may have foods or drinks that contain calcium as part of a larger meal.  · Swallow the extended-release tablet whole. Do not crush, break, or chew it. · Oral liquid: Shake for at least 15 seconds just before each use. The liquid has small beads floating in it. Do not chew the beads when you drink the liquid. Measure the oral liquid medicine with a marked measuring spoon, oral syringe, or medicine cup. · Tablet: Swallow whole. Do not break, crush, or chew it. · Drink extra fluids so you will urinate more often and help prevent kidney problems.   · Take all of the medicine in your prescription to clear up your infection, even if you feel better after the first few doses. · This medicine should come with a Medication Guide. Ask your pharmacist for a copy if you do not have one. · Missed dose: Take a dose as soon as you remember. If it is almost time for your next dose, wait until then and take a regular dose. Do not take extra medicine to make up for a missed dose. · Store the medicine in a closed container at room temperature, away from heat, moisture, and direct light. Throw away any leftover liquid medicine after 14 days. Drugs and Foods to Avoid:   Ask your doctor or pharmacist before using any other medicine, including over-the-counter medicines, vitamins, and herbal products. · Do not use this medicine together with tizanidine. · Some foods and medicines can affect how ciprofloxacin works. Tell your doctor if you are using any of the following:  ¨ Clozapine, cyclosporine, duloxetine, lidocaine, methotrexate, olanzapine, pentoxifylline, phenytoin, probenecid, ropinirole, sildenafil, theophylline  ¨ Antibiotic (including azithromycin, clarithromycin, erythromycin)  ¨ Blood thinner (including warfarin)  ¨ Diabetes medicine (including glimepiride, glyburide)  ¨ Medicine for depression or mental illness  ¨ Medicine for heart rhythm problems (including amiodarone, procainamide, quinidine, sotalol)  ¨ NSAID pain medicine (including aspirin, celecoxib, diclofenac, ibuprofen, naproxen)  ¨ Steroid medicine (including hydrocortisone, methylprednisolone, prednisone)  · Take ciprofloxacin at least 2 hours before or 6 hours after you take antacids containing aluminum or magnesium, calcium, zinc, iron, lanthanum, sevelamer, sucralfate, and didanosine. This includes vitamin/mineral supplements.   · This medicine slows the digestion of caffeine, so it might affect you for longer than normal.  Warnings While Using This Medicine:   · Tell your doctor if you are pregnant or breastfeeding, or if you have kidney disease, liver disease, diabetes, heart disease, myasthenia gravis, or a history of heart rhythm problems (such as prolonged QT interval) or seizures. Tell your doctor if you have ever had tendon or joint problems, including rheumatoid arthritis, or if you have received a transplant. · This medicine may cause the following problems:  ¨ Tendinitis and tendon rupture (may happen after treatment ends)  ¨ Liver damage  ¨ Nerve damage in the arms or legs  ¨ Heart rhythm changes  ¨ Changes in blood sugar levels  · This medicine may make you dizzy, drowsy, or lightheaded. Do not drive or do anything that could be dangerous until you know how this medicine affects you. · This medicine can cause diarrhea. Call your doctor if the diarrhea becomes severe, does not stop, or is bloody. Do not take any medicine to stop diarrhea until you have talked to your doctor. Diarrhea can occur 2 months or more after you stop taking this medicine. · This medicine may make your skin more sensitive to sunlight. Wear sunscreen. Do not use sunlamps or tanning beds. · Call your doctor if your symptoms do not improve or if they get worse. · Keep all medicine out of the reach of children. Never share your medicine with anyone.   Possible Side Effects While Using This Medicine:   Call your doctor right away if you notice any of these side effects:  · Allergic reaction: Itching or hives, swelling in your face or hands, swelling or tingling in your mouth or throat, chest tightness, trouble breathing  · Blistering, peeling, red skin rash  · Dark-colored urine or pale stools, nausea, vomiting, loss of appetite, pain in your upper stomach, yellow skin or eyes  · Diarrhea that may contain blood  · Fainting, dizziness, or lightheadedness  · Fast, slow, or uneven heartbeat  · Numbness, tingling, weakness, or burning pain in your hands, arms, legs, or feet  · Pain, stiffness, swelling, or bruises around your ankle, leg, shoulder, or other joint  · Seizures, severe headache, unusual thoughts or behaviors, trouble sleeping, feeling anxious, confused, or depressed, seeing, hearing, or feeling things that are not there  · Unusual bleeding, bruising, or weakness  If you notice other side effects that you think are caused by this medicine, tell your doctor. Call your doctor for medical advice about side effects. You may report side effects to FDA at 3-363-VFB-8006  © 2017 [address] [employer] Information is for End User's use only and may not be sold, redistributed or otherwise used for commercial purposes. The above information is an  only. It is not intended as medical advice for individual conditions or treatments. Talk to your doctor, nurse or pharmacist before following any medical regimen to see if it is safe and effective for you. DISCHARGE SUMMARY from Nurse    The following personal items are in your possession at time of discharge:    Dental Appliances: None  Visual Aid: None     Home Medications: None  Jewelry: None  Clothing: Pants, Shirt, Undergarments, Footwear      PATIENT INSTRUCTIONS:    After general anesthesia or intravenous sedation, for 24 hours or while taking prescription Narcotics:  · Limit your activities  · Do not drive and operate hazardous machinery  · Do not make important personal or business decisions  · Do  not drink alcoholic beverages  · If you have not urinated within 8 hours after discharge, please contact your surgeon on call.     Report the following to your surgeon:  · Excessive pain, swelling, redness or odor of or around the surgical area  · Temperature over 100.5  · Nausea and vomiting lasting longer than 4 hours or if unable to take medications  · Any signs of decreased circulation or nerve impairment to extremity: change in color, persistent  numbness, tingling, coldness or increase pain  · Any questions    *  Please give a list of your current medications to your Primary Care Provider. *  Please update this list whenever your medications are discontinued, doses are      changed, or new medications (including over-the-counter products) are added. *  Please carry medication information at all times in case of emergency situations. These are general instructions for a healthy lifestyle:    No smoking/ No tobacco products/ Avoid exposure to second hand smoke    Surgeon General's Warning:  Quitting smoking now greatly reduces serious risk to your health. Obesity, smoking, and sedentary lifestyle greatly increases your risk for illness    A healthy diet, regular physical exercise & weight monitoring are important for maintaining a healthy lifestyle    You may be retaining fluid if you have a history of heart failure or if you experience any of the following symptoms:  Weight gain of 3 pounds or more overnight or 5 pounds in a week, increased swelling in our hands or feet or shortness of breath while lying flat in bed. Please call your doctor as soon as you notice any of these symptoms; do not wait until your next office visit. Recognize signs and symptoms of STROKE:    F-face looks uneven    A-arms unable to move or move unevenly    S-speech slurred or non-existent    T-time-call 911 as soon as signs and symptoms begin-DO NOT go       Back to bed or wait to see if you get better-TIME IS BRAIN. Warning Signs of HEART ATTACK     Call 911 if you have these symptoms:   Chest discomfort. Most heart attacks involve discomfort in the center of the chest that lasts more than a few minutes, or that goes away and comes back. It can feel like uncomfortable pressure, squeezing, fullness, or pain.  Discomfort in other areas of the upper body. Symptoms can include pain or discomfort in one or both arms, the back, neck, jaw, or stomach.  Shortness of breath with or without chest discomfort.  Other signs may include breaking out in a cold sweat, nausea, or lightheadedness.   Don't wait more than five minutes to call 911 - MINUTES MATTER! Fast action can save your life. Calling 911 is almost always the fastest way to get lifesaving treatment. Emergency Medical Services staff can begin treatment when they arrive -- up to an hour sooner than if someone gets to the hospital by car. The discharge information has been reviewed with the patient and spouse. The patient and spouse verbalized understanding. Discharge medications reviewed with the patient and spouse and appropriate educational materials and side effects teaching were provided.

## 2017-06-08 NOTE — PROGRESS NOTES
His prostate biopsy is all benign. There is a little bit of chronic inflammatory changes on a couple of the specimens and I would expect that to account for some of the elevation of the PSA for his age. Continue to follow him on an annual basis.

## 2017-06-12 ENCOUNTER — TELEPHONE (OUTPATIENT)
Dept: UROLOGY | Age: 57
End: 2017-06-12

## 2017-06-12 NOTE — TELEPHONE ENCOUNTER
I called and left a voicemail message to ensure that the patient in fact has received his pathology results

## 2017-06-13 ENCOUNTER — TELEPHONE (OUTPATIENT)
Dept: UROLOGY | Age: 57
End: 2017-06-13

## 2017-06-13 ENCOUNTER — DOCUMENTATION ONLY (OUTPATIENT)
Dept: UROLOGY | Age: 57
End: 2017-06-13

## 2017-06-15 ENCOUNTER — OFFICE VISIT (OUTPATIENT)
Dept: UROLOGY | Age: 57
End: 2017-06-15

## 2017-06-15 VITALS
SYSTOLIC BLOOD PRESSURE: 114 MMHG | OXYGEN SATURATION: 96 % | HEART RATE: 70 BPM | DIASTOLIC BLOOD PRESSURE: 69 MMHG | BODY MASS INDEX: 33.12 KG/M2 | WEIGHT: 211 LBS | HEIGHT: 67 IN

## 2017-06-15 DIAGNOSIS — R97.20 ELEVATED PSA: Primary | ICD-10-CM

## 2017-06-15 LAB
BILIRUB UR QL STRIP: NEGATIVE
GLUCOSE UR-MCNC: NEGATIVE MG/DL
KETONES P FAST UR STRIP-MCNC: NEGATIVE MG/DL
PH UR STRIP: 6 [PH] (ref 4.6–8)
PROT UR QL STRIP: NEGATIVE MG/DL
SP GR UR STRIP: 1.02 (ref 1–1.03)
UA UROBILINOGEN AMB POC: NORMAL (ref 0.2–1)
URINALYSIS CLARITY POC: CLEAR
URINALYSIS COLOR POC: YELLOW
URINE BLOOD POC: NORMAL
URINE LEUKOCYTES POC: NEGATIVE
URINE NITRITES POC: NEGATIVE

## 2017-06-15 NOTE — MR AVS SNAPSHOT
Visit Information Date & Time Provider Department Dept. Phone Encounter #  
 6/15/2017  3:30 PM Barbara Duque, [place] [facility]  Upcoming Health Maintenance Date Due Hepatitis C Screening 1960 INFLUENZA AGE 9 TO ADULT 8/1/2017 DTaP/Tdap/Td series (2 - Td) 1/14/2022 COLONOSCOPY 4/25/2022 Allergies as of 6/15/2017  Review Complete On: 6/15/2017 By: Claudine Dyson LPN No Known Allergies Current Immunizations  Reviewed on 1/14/2012 Name Date TDAP Vaccine 1/14/2012 Not reviewed this visit You Were Diagnosed With   
  
 Codes Comments Elevated PSA    -  Primary ICD-10-CM: R97.20 ICD-9-CM: 790.93 Vitals BP Pulse Height(growth percentile) Weight(growth percentile) SpO2 BMI  
 114/69 (BP 1 Location: Left arm, BP Patient Position: Sitting) 70 5' 7\" (1.702 m) 211 lb (95.7 kg) 96% 33.05 kg/m2 Smoking Status Never Smoker Vitals History BMI and BSA Data Body Mass Index Body Surface Area 33.05 kg/m 2 2.13 m 2 Preferred Pharmacy Pharmacy Name Phone [phone] [phone] Your Updated Medication List  
  
   
This list is accurate as of: 6/15/17  3:59 PM.  Always use your most recent med list.  
  
  
  
  
 aspirin 81 mg chewable tablet Take 81 mg by mouth daily. We Performed the Following AMB POC URINALYSIS DIP STICK AUTO W/O MICRO [57153 CPT(R)] Patient Instructions Prostate-Specific Antigen (PSA) Test: About This Test 
What is it? A prostate-specific antigen (PSA) test measures the amount of PSA in your blood. PSA is released by a man's prostate gland into his blood. A high PSA level may mean that you have an enlargement, infection, or cancer of the prostate. Why is this test done? You may have this test to: · Check for prostate cancer. · Watch prostate cancer and see if treatment is working. How can you prepare for the test? 
Do not ejaculate during the 2 days before your PSA blood test, either during sex or masturbation. What happens before the test? 
Tell your doctor if you have had a: 
· Test to look at your bladder (cystoscopy) in the past several weeks. · Prostate biopsy in the past several weeks. · Prostate infection or urinary tract infection that has not gone away. · Tube (catheter) inserted into your bladder to drain urine recently. What happens during the test? 
A health professional takes a sample of your blood. What happens after the test? 
You can go back to your usual activities right away. When should you call for help? Watch closely for changes in your health, and be sure to contact your doctor if you have any questions about this test. 
Follow-up care is a key part of your treatment and safety. Be sure to make and go to all appointments, and call your doctor if you are having problems. It's also a good idea to keep a list of the medicines you take. Ask your doctor when you can expect to have your test results. Where can you learn more? Go to http://reina-marge.info/. Enter L213 in the search box to learn more about \"Prostate-Specific Antigen (PSA) Test: About This Test.\" Current as of: July 26, 2016 Content Version: 11.2 © 1503-6522 [employer]. Care instructions adapted under license by [employer] (which disclaims liability or warranty for this information). If you have questions about a medical condition or this instruction, always ask your healthcare professional. [place] [address] any warranty or liability for your use of this information. Introducing [facility]! Dear Flash Eldridge: 
Thank you for requesting a [employer] account. Our records indicate that you already have an active [employer] account.   You can access your account anytime at [email or web address]. [employer] vascular [email or web address]/[email or web address] Did you know that you can access your hospital and ER discharge instructions at any time in [employer]? You can also review all of your test results from your hospital stay or ER visit. Additional Information If you have questions, please visit the Frequently Asked Questions section of the [employer] website at [email or web address] Remember, [employer] is NOT to be used for urgent needs. For medical emergencies, dial 911. Now available from your iPhone and Android! Please provide this summary of care documentation to your next provider. Your primary care clinician is listed as Cecily Arzola. If you have any questions after today's visit, please call [phone].

## 2017-06-15 NOTE — PATIENT INSTRUCTIONS
Prostate-Specific Antigen (PSA) Test: About This Test  What is it? A prostate-specific antigen (PSA) test measures the amount of PSA in your blood. PSA is released by a man's prostate gland into his blood. A high PSA level may mean that you have an enlargement, infection, or cancer of the prostate. Why is this test done? You may have this test to:  · Check for prostate cancer. · Watch prostate cancer and see if treatment is working. How can you prepare for the test?  Do not ejaculate during the 2 days before your PSA blood test, either during sex or masturbation. What happens before the test?  Tell your doctor if you have had a:  · Test to look at your bladder (cystoscopy) in the past several weeks. · Prostate biopsy in the past several weeks. · Prostate infection or urinary tract infection that has not gone away. · Tube (catheter) inserted into your bladder to drain urine recently. What happens during the test?  A health professional takes a sample of your blood. What happens after the test?  You can go back to your usual activities right away. When should you call for help? Watch closely for changes in your health, and be sure to contact your doctor if you have any questions about this test.  Follow-up care is a key part of your treatment and safety. Be sure to make and go to all appointments, and call your doctor if you are having problems. It's also a good idea to keep a list of the medicines you take. Ask your doctor when you can expect to have your test results. Where can you learn more? Go to http://reina-marge.info/. Enter C527 in the search box to learn more about \"Prostate-Specific Antigen (PSA) Test: About This Test.\"  Current as of: July 26, 2016  Content Version: 11.2  © 2656-6458 [employer]. Care instructions adapted under license by [employer] (which disclaims liability or warranty for this information).  If you have questions about a medical condition or this instruction, always ask your healthcare professional. [place] [address] any warranty or liability for your use of this information.

## 2017-06-15 NOTE — PROGRESS NOTES
Mr. Marques Nielsen has a reminder for a \"due or due soon\" health maintenance. I have asked that he contact his primary care provider for follow-up on this health maintenance.

## 2017-06-15 NOTE — PROGRESS NOTES
The patient returns to discuss the results of his recent prostate biopsy. I had called and left a message on his [employer] machine regarding this but he comes in because of an already scheduled appointment. The patient has a significant family history with his father and virtually all of his father's brothers having had prostate cancer patient himself was referred for a PSA velocity change within one calendar year that was suspicious. The biopsies showed no evidence of malignancy but there were scattered areas of both chronic and acute inflammation. Likewise, there were also areas of atrophy. We have discussed the biopsy results in light of his family history and in light of the presence of subclinical inflammation. In retrospect, the patient now feels that he may occasionally have some symptomatic prostatic inflammation. We may be able to use that as a guide to reduce the risk of drawing a significantly elevated PSA at a time when the patient actually is having symptoms. I am going to change the patient to free PSA determinations and I have explained the reason for that to the patient. We have also talked about dietary interventions for prostate health    This visit exceeded 15 minutes and greater than 50% was counseling. The patient expresses understanding of the treatment plan and wishes to proceed    This dictation used voice recognition software and there may be mistakes.     Igor Farnsworth MD

## 2017-12-14 ENCOUNTER — HOSPITAL ENCOUNTER (OUTPATIENT)
Dept: LAB | Age: 57
Discharge: HOME | End: 2017-12-14
Payer: OTHER GOVERNMENT

## 2017-12-14 ENCOUNTER — OFFICE VISIT (OUTPATIENT)
Dept: UROLOGY | Age: 57
End: 2017-12-14

## 2017-12-14 VITALS
SYSTOLIC BLOOD PRESSURE: 129 MMHG | WEIGHT: 213 LBS | HEIGHT: 67 IN | OXYGEN SATURATION: 95 % | HEART RATE: 78 BPM | BODY MASS INDEX: 33.43 KG/M2 | DIASTOLIC BLOOD PRESSURE: 84 MMHG

## 2017-12-14 DIAGNOSIS — N41.1 CHRONIC PROSTATITIS: ICD-10-CM

## 2017-12-14 DIAGNOSIS — R97.20 ELEVATED PSA: ICD-10-CM

## 2017-12-14 DIAGNOSIS — R97.20 ELEVATED PSA: Primary | ICD-10-CM

## 2017-12-14 LAB
BILIRUB UR QL STRIP: NEGATIVE
GLUCOSE UR-MCNC: NEGATIVE MG/DL
KETONES P FAST UR STRIP-MCNC: NEGATIVE MG/DL
PH UR STRIP: 7 [PH] (ref 4.6–8)
PROT UR QL STRIP: NEGATIVE
SP GR UR STRIP: 1.02 (ref 1–1.03)
UA UROBILINOGEN AMB POC: NORMAL (ref 0.2–1)
URINALYSIS CLARITY POC: CLEAR
URINALYSIS COLOR POC: YELLOW
URINE BLOOD POC: NEGATIVE
URINE LEUKOCYTES POC: NEGATIVE
URINE NITRITES POC: NEGATIVE

## 2017-12-14 PROCEDURE — 84154 ASSAY OF PSA FREE: CPT | Performed by: UROLOGY

## 2017-12-14 RX ORDER — CYCLOBENZAPRINE HCL 10 MG
TABLET ORAL
Refills: 0 | COMMUNITY
Start: 2017-12-01

## 2017-12-14 NOTE — PROGRESS NOTES
Chief Complaint   Patient presents with    Elevated PSA       HISTORY OF PRESENT ILLNESS:  Anna Ma is a 56 y.o. male who presents today in follow-up to his biopsy last June. This showed changes of both acute and chronic prostatitis but no malignancy so I think these are explanations for the accelerated change. I have encouraged him to continue his annual PSA determinations as well as having an annual rectal exam.  His family history is significant with prostate cancer since his father and a number of his father's brothers have all had prostate cancer. At this point in time I see no reason for him to change his present method of evaluation. We will see him back as necessary. ROS unchanged since last office visit. Past Medical History:   Diagnosis Date    Hypercholesterolemia     Plantar fasciitis     Right knee pain     Sleep apnea     uses Cpap machine       Past Surgical History:   Procedure Laterality Date    HX APPENDECTOMY  ~1976       Social History   Substance Use Topics    Smoking status: Never Smoker    Smokeless tobacco: Never Used      Comment: 1/1981    Alcohol use No       No Known Allergies    Family History   Problem Relation Age of Onset    Depression Mother     Asthma Mother     Diabetes Mother    [facility] Hypertension Mother     Depression Father     Cancer Father      prostate    Hypertension Father     High Cholesterol Father     Dementia Father     Prostate Cancer Paternal Uncle     Prostate Cancer Paternal Uncle     Prostate Cancer Paternal Uncle     Prostate Cancer Paternal Uncle        Current Outpatient Prescriptions   Medication Sig Dispense Refill    cyclobenzaprine (FLEXERIL) 10 mg tablet   0    GLUCOSAMINE SULFATE (GLUCOSAMINE PO) Take  by mouth daily.  aspirin 81 mg chewable tablet Take 81 mg by mouth daily.            PHYSICAL EXAMINATION:   Visit Vitals    /84 (BP 1 Location: Left arm, BP Patient Position: Sitting)    Pulse 78    Ht 5' 7\" (1.702 m)    Wt 213 lb (96.6 kg)    SpO2 95%    BMI 33.36 kg/m2     Constitutional: WDWN, Pleasant and appropriate affect, No acute distress. CV:  No peripheral swelling noted  Respiratory: No respiratory distress or difficulties  Abdomen:  No abdominal masses or tenderness. No CVA tenderness. No inguinal hernias noted.  Male:    Deferred today. Skin: No jaundice. Neuro/Psych:  Alert and oriented x 3, affect appropriate. Lymphatic:   No enlarged inguinal lymph nodes. Results for orders placed or performed in visit on 12/14/17   AMB POC URINALYSIS DIP STICK AUTO W/O MICRO   Result Value Ref Range    Color (UA POC) Yellow     Clarity (UA POC) Clear     Glucose (UA POC) Negative Negative    Bilirubin (UA POC) Negative Negative    Ketones (UA POC) Negative Negative    Specific gravity (UA POC) 1.020 1.001 - 1.035    Blood (UA POC) Negative Negative    pH (UA POC) 7.0 4.6 - 8.0    Protein (UA POC) Negative Negative    Urobilinogen (UA POC) 0.2 mg/dL 0.2 - 1    Nitrites (UA POC) Negative Negative    Leukocyte esterase (UA POC) Negative Negative         REVIEW OF LABS AND IMAGING:     Imaging Report Reviewed? NO     Images Reviewed? NO           Other Lab Data Reviewed? YES         ASSESSMENT:     ICD-10-CM ICD-9-CM    1. Elevated PSA R97.20 790.93 AMB POC URINALYSIS DIP STICK AUTO W/O MICRO      NM COLLECTION VENOUS BLOOD,VENIPUNCTURE      PSA, TOTAL &  FREE   2. Chronic prostatitis N41.1 601.1             PLAN / DISCUSSION: : He is doing very well and I have encouraged him to continue with his primary care physician and obtaining a PSA on an annual basis as well as a WES. We will be glad to see him anytime there any changes in this. The patient expresses understanding and agreement of the discussion and plan. Key Narvaez MD on 12/14/2017         Please note: This document has been produced using voice recognition software.  Unrecognized errors in transcription may be present.

## 2017-12-14 NOTE — MR AVS SNAPSHOT
Visit Information Date & Time Provider Department Dept. Phone Encounter #  
 12/14/2017  3:30 PM [facility] [phone] [id number] Your Appointments 12/14/2017  3:30 PM  
Office Visit with Vitaliy Marin MD  
[facility]) Appt Note: check up [address] [id number]  
[phone] [address] [facility] [address] Upcoming Health Maintenance Date Due Hepatitis C Screening 1960 Influenza Age 5 to Adult 8/1/2017 DTaP/Tdap/Td series (2 - Td) 1/14/2022 COLONOSCOPY 4/25/2022 Allergies as of 12/14/2017  Review Complete On: 12/14/2017 By: Yohana Mcghee LPN No Known Allergies Current Immunizations  Reviewed on 1/14/2012 Name Date TDAP Vaccine 1/14/2012 Not reviewed this visit You Were Diagnosed With   
  
 Codes Comments Elevated PSA    -  Primary ICD-10-CM: R97.20 ICD-9-CM: 790.93 Vitals BP Pulse Height(growth percentile) Weight(growth percentile) SpO2 BMI  
 129/84 (BP 1 Location: Left arm, BP Patient Position: Sitting) 78 5' 7\" (1.702 m) 213 lb (96.6 kg) 95% 33.36 kg/m2 Smoking Status Never Smoker Vitals History BMI and BSA Data Body Mass Index Body Surface Area  
 33.36 kg/m 2 2.14 m 2 Preferred Pharmacy Pharmacy Name Phone [facility] [address] [phone] Your Updated Medication List  
  
   
This list is accurate as of: 12/14/17  3:08 PM.  Always use your most recent med list.  
  
  
  
  
 aspirin 81 mg chewable tablet Take 81 mg by mouth daily. cyclobenzaprine 10 mg tablet Commonly known as:  FLEXERIL  
  
 GLUCOSAMINE PO Take  by mouth daily. We Performed the Following AMB POC URINALYSIS DIP STICK AUTO W/O MICRO [69130 CPT(R)] [place] COLLECTION VENOUS BLOOD,VENIPUNCTURE [id number] CPT(R)] To-Do List   
 12/14/2017 Lab:  PSA, TOTAL &  FREE Patient Instructions Prostate-Specific Antigen (PSA) Test: About This Test 
What is it? A prostate-specific antigen (PSA) test measures the amount of PSA in your blood. PSA is released by a man's prostate gland into his blood. A high PSA level may mean that you have an enlargement, infection, or cancer of the prostate. Why is this test done? You may have this test to: · Check for prostate cancer. · Watch prostate cancer and see if treatment is working. How can you prepare for the test? 
Do not ejaculate during the 2 days before your PSA blood test, either during sex or masturbation. What happens before the test? 
Tell your doctor if you have had a: 
· Test to look at your bladder (cystoscopy) in the past several weeks. · Prostate biopsy in the past several weeks. · Prostate infection or urinary tract infection that has not gone away. · Tube (catheter) inserted into your bladder to drain urine recently. What happens during the test? 
A health professional takes a sample of your blood. What happens after the test? 
You can go back to your usual activities right away. When should you call for help? Watch closely for changes in your health, and be sure to contact your doctor if you have any questions about this test. 
Follow-up care is a key part of your treatment and safety. Be sure to make and go to all appointments, and call your doctor if you are having problems. It's also a good idea to keep a list of the medicines you take. Ask your doctor when you can expect to have your test results. Where can you learn more? Go to http://reina-marge.info/. Enter E988 in the search box to learn more about \"Prostate-Specific Antigen (PSA) Test: About This Test.\" Current as of: May 12, 2017 Content Version: 11.4 © 6381-1693 Healthwise, [employer]. Care instructions adapted under license by [employer] (which disclaims liability or warranty for this information). If you have questions about a medical condition or this instruction, always ask your healthcare professional. [address] any warranty or liability for your use of this information. Introducing [facility]! Dear Daniela Navas: 
Thank you for requesting a Quick[employer] account. Our records indicate that you already have an active Quick[employer] account. You can access your account anytime at [email or web address] Did you know that you can access your hospital and ER discharge instructions at any time in Quickflix? You can also review all of your test results from your hospital stay or ER visit. Additional Information If you have questions, please visit the Frequently Asked Questions section of the Quickflix website at [email or web address] Remember, Quickflix is NOT to be used for urgent needs. For medical emergencies, dial 911. Now available from your iPhone and Android! Please provide this summary of care documentation to your next provider. Your primary care clinician is listed as Paralee Morning. If you have any questions after today's visit, please call [phone].

## 2017-12-14 NOTE — PATIENT INSTRUCTIONS
Prostate-Specific Antigen (PSA) Test: About This Test  What is it? A prostate-specific antigen (PSA) test measures the amount of PSA in your blood. PSA is released by a man's prostate gland into his blood. A high PSA level may mean that you have an enlargement, infection, or cancer of the prostate. Why is this test done? You may have this test to:  · Check for prostate cancer. · Watch prostate cancer and see if treatment is working. How can you prepare for the test?  Do not ejaculate during the 2 days before your PSA blood test, either during sex or masturbation. What happens before the test?  Tell your doctor if you have had a:  · Test to look at your bladder (cystoscopy) in the past several weeks. · Prostate biopsy in the past several weeks. · Prostate infection or urinary tract infection that has not gone away. · Tube (catheter) inserted into your bladder to drain urine recently. What happens during the test?  A health professional takes a sample of your blood. What happens after the test?  You can go back to your usual activities right away. When should you call for help? Watch closely for changes in your health, and be sure to contact your doctor if you have any questions about this test.  Follow-up care is a key part of your treatment and safety. Be sure to make and go to all appointments, and call your doctor if you are having problems. It's also a good idea to keep a list of the medicines you take. Ask your doctor when you can expect to have your test results. Where can you learn more? Go to http://reina-marge.info/. Enter C918 in the search box to learn more about \"Prostate-Specific Antigen (PSA) Test: About This Test.\"  Current as of: May 12, 2017  Content Version: 11.4  © 0816-8510 [employer]. Care instructions adapted under license by [employer] (which disclaims liability or warranty for this information).  If you have questions about a medical condition or this instruction, always ask your healthcare professional. [place] [address] any warranty or liability for your use of this information.

## 2017-12-14 NOTE — PROGRESS NOTES
RBV. Per Dr. Adrianna Lock lab drawn in office today for PSA free and total for elevated psa. Mr. Griselda Lu has a reminder for a \"due or due soon\" health maintenance. I have asked that he contact his primary care provider for follow-up on this health maintenance.

## 2017-12-15 LAB
PSA FREE MFR SERPL: 10.8 %
PSA FREE SERPL-MCNC: 0.28 NG/ML
PSA SERPL-MCNC: 2.6 NG/ML (ref 0–4)
